# Patient Record
(demographics unavailable — no encounter records)

---

## 2025-02-05 NOTE — PHYSICAL EXAM
[Chaperone Present] : A chaperone was present in the examining room during all aspects of the physical examination [30770] : A chaperone was present during the pelvic exam. [Appropriately responsive] : appropriately responsive [Alert] : alert [No Acute Distress] : no acute distress [Soft] : soft [Non-tender] : non-tender [Non-distended] : non-distended [Oriented x3] : oriented x3 [Examination Of The Breasts] : a normal appearance [No Discharge] : no discharge [No Masses] : no breast masses were palpable [Labia Majora] : normal [Labia Minora] : normal [Atrophy] : atrophy [Dry Mucosa] : dry mucosa [Normal] : normal [Uterine Adnexae] : normal [Scant] : There was scant vaginal bleeding

## 2025-02-05 NOTE — PHYSICAL EXAM
[Chaperone Present] : A chaperone was present in the examining room during all aspects of the physical examination [60055] : A chaperone was present during the pelvic exam. [Appropriately responsive] : appropriately responsive [Alert] : alert [No Acute Distress] : no acute distress [Soft] : soft [Non-tender] : non-tender [Non-distended] : non-distended [Oriented x3] : oriented x3 [Examination Of The Breasts] : a normal appearance [No Discharge] : no discharge [No Masses] : no breast masses were palpable [Labia Majora] : normal [Labia Minora] : normal [Atrophy] : atrophy [Dry Mucosa] : dry mucosa [Normal] : normal [Uterine Adnexae] : normal [Scant] : There was scant vaginal bleeding

## 2025-02-11 NOTE — HISTORY OF PRESENT ILLNESS
[N] : Patient does not use contraception [Y] : Positive pregnancy history [No] : Patient does not have concerns regarding sex [Men] : men [Patient reported PAP Smear was normal] : Patient reported PAP Smear was normal [Patient reported colonoscopy was normal] : Patient reported colonoscopy was normal [postmenopausal] : postmenopausal [Menarche Age: ____] : age at menarche was [unfilled] [Menopause Age: ____] : age at menopause was [unfilled] [Previously active] : previously active [Mammogramdate] : 02/16/23 [TextBox_19] : BR1 [PapSmeardate] : 2022 [BoneDensityDate] : NEVER [ColonoscopyDate] : 09/2022 [LMPDate] : 05/2022 [PGHxTotal] : 2 [[facility]] : 2 [[facility]] : 2 [FreeTextEntry1] : 05/2022

## 2025-02-11 NOTE — END OF VISIT
[FreeTextEntry3] : I, Haim Rasmussen solely acted as scribe for Dr. Lacie Gamboa on 02/05/2025  All medical entries made by the Scribe were at my, Dr. Fernandez, direction and personally dictated by me on 02/05/2025 . I have reviewed the chart and agree that the record accurately reflects my personal performance of the history, physical exam, assessment and plan. I have also personally directed, reviewed, and agreed with the chart.

## 2025-02-11 NOTE — HISTORY OF PRESENT ILLNESS
[N] : Patient does not use contraception [Y] : Positive pregnancy history [No] : Patient does not have concerns regarding sex [Men] : men [Patient reported PAP Smear was normal] : Patient reported PAP Smear was normal [Patient reported colonoscopy was normal] : Patient reported colonoscopy was normal [postmenopausal] : postmenopausal [Menarche Age: ____] : age at menarche was [unfilled] [Menopause Age: ____] : age at menopause was [unfilled] [Previously active] : previously active [Mammogramdate] : 02/16/23 [TextBox_19] : BR1 [PapSmeardate] : 2022 [BoneDensityDate] : NEVER [ColonoscopyDate] : 09/2022 [LMPDate] : 05/2022 [PGHxTotal] : 2 [[facility]] : 2 [[facility]xLiving] : 2 [FreeTextEntry1] : 05/2022

## 2025-02-11 NOTE — PLAN
[FreeTextEntry1] : Differential diagnosis of dysfunctional uterine bleeding discussed at length including structural, hormonal, and malignant causes.   1. CBC & Thyroid studies were drawn today to assess for hormonal causes. We will review lab results at her next visit.  2. A pelvic ultrasound was ordered as well.   3. We also discussed the need for hysteroscopy with biopsy to R/O structural and malignant causes. The procedure was discussed in detail. She will premedicate with motrin as there is cramping associated with this procedure.  4. She will continue menopausal hormone therapy for now until hysteroscopy. Prescription renewals for transdermal estradiol patch 0.075 MG and 100 MG of oral progesterone were given in the meantime.   5. Up to date with colon cancer screening.   6. Aptima culture collected to r/o any vaginal infections that are causing her bleeding.   7. Pap done today.  8. Prescription for mammogram screening given. Self-breast exam reviewed.  F/u for pelvic sono and hysteroscopy or as needed.

## 2025-03-31 NOTE — END OF VISIT
[FreeTextEntry3] : I, Haim Rasmussen solely acted as scribe for Dr. Lacie Gamboa on 03/31/2025  All medical entries made by the Scribe were at my, Dr. Fernandez, direction and personally dictated by me on 03/31/2025 . I have reviewed the chart and agree that the record accurately reflects my personal performance of the history, physical exam, assessment and plan. I have also personally directed, reviewed, and agreed with the chart.

## 2025-03-31 NOTE — HISTORY OF PRESENT ILLNESS
[Patient reported colonoscopy was normal] : Patient reported colonoscopy was normal [postmenopausal] : postmenopausal [N] : Patient does not use contraception [Y] : Positive pregnancy history [Menarche Age: ____] : age at menarche was [unfilled] [Menopause Age: ____] : age at menopause was [unfilled] [No] : Patient does not have concerns regarding sex [Previously active] : previously active [Men] : men [Mammogramdate] : 02/16/23 [TextBox_19] : BR1 [Papeardate] : 02/05/25 [TextBox_31] : NEG [BoneDensityDate] : NEVER [ColonoscopyDate] : 09/2022 [HPVDate] : 02/05/25 [TextBox_78] : NEG [LMPDate] : 05/2022 [PGHxTotal] : 2 [[facility]] : 2 [[facility]] : 2 [FreeTextEntry1] : 05/2022

## 2025-03-31 NOTE — PLAN
[FreeTextEntry1] : Normal hysteroscopy findings reviewed with pt. Small LT sided uterine polyp removed in its entirety.   Will call with EMB.   Abnormal bleeding likely secondary to progesterone. Will switch from progesterone to provera 2.5 MG to see if this stops her bleeding. She is aware that for the first three to six months she can have irregular bleeding or breast tenderness.  F/u in three months for bleeding f/u or as needed.

## 2025-07-14 NOTE — DISCUSSION/SUMMARY
[EKG obtained to assist in diagnosis and management of assessed problem(s)] : EKG obtained to assist in diagnosis and management of assessed problem(s) [FreeTextEntry1] : Pt is a 57 y/o F PMH HTN, palpitations (rare PACs/PVCs seen on tabares) on bystolic, HLD, preDM, BMI 32.     palpitations: on bystolic  HTN: well controlled c/w amlodipine 10mg qd c/w bystolic 20mg qd Discussed the long-term health risks of uncontrolled BP.  Advised low salt diet, regular exercise, maintaining ideal weight. Encouraged pt to check BP at home and keep journal   HLD: she never started statin repeat labs and if chol still elevated, start statin Advised lifestyle modifications  repeat labs in 3-4m  preDM: Advise lifestyle modifications   Pt will return in 6 mnths or sooner as needed but I encouraged communication via phone or portal if necessary.  The described plan was discussed with the pt.  All questions and concerns were addressed to the best of my knowledge.    Today's office visit encompassed 32 minutes which excludes teaching and separately reported services. I conducted an extensive history, physical exam and reviewed diagnosis and treatment options including diagnostic tests, radiology studies including cat scans and the use of prescription medication.

## 2025-07-14 NOTE — HISTORY OF PRESENT ILLNESS
[FreeTextEntry1] : Pt is a 57 y/o F PMH HTN, palpitations (rare PACs/PVCs seen on tabares) on bystolic, HLD, preDM, BMI 32.     Pt reports feeling well and has no active cardiac complaints - denies CP, SOB, palpitations, dizziness, syncope, edema, orthopnea, PND, orthopnea.  No exertional symptoms. No new hospitalizations, emergency room/urgent care visits, new medical diagnoses, new medications, surgery, or cardiac testing since last OV.  Home 's/70-80's  TTE 05/2020 EF 55%, min MR/TR TTE 12/2022 EF 65-70%, mild-mod MR, mild TR ETT 06/2020 10 METS, no ischemic changes nuc stress test 02/2023 normal myocardial perfusion tabares 03/2019 NSR with rare PVCs/PACs  Smoking status: never Etoh none no drug use Current exercise: walking 3-4x/week  Daily water intake: 60-80oz Daily caffeine intake: 2 cups coffee, 2 cups iced tea OTC medications: advil/tylenol PRN Family hx: no immediate family hx cardiac disease Previous hospitalizations: [facility] 2016 - no problems with anesthesia 2 children - no problems with pregnancies